# Patient Record
Sex: FEMALE | Race: WHITE | URBAN - METROPOLITAN AREA
[De-identification: names, ages, dates, MRNs, and addresses within clinical notes are randomized per-mention and may not be internally consistent; named-entity substitution may affect disease eponyms.]

---

## 2017-05-25 ENCOUNTER — TRANSFERRED RECORDS (OUTPATIENT)
Dept: HEALTH INFORMATION MANAGEMENT | Facility: CLINIC | Age: 67
End: 2017-05-25

## 2017-05-31 ENCOUNTER — OFFICE VISIT (OUTPATIENT)
Dept: OPHTHALMOLOGY | Facility: CLINIC | Age: 67
End: 2017-05-31

## 2017-05-31 DIAGNOSIS — H02.9 EYELID LESION: Primary | ICD-10-CM

## 2017-05-31 PROCEDURE — 88305 TISSUE EXAM BY PATHOLOGIST: CPT | Performed by: OPHTHALMOLOGY

## 2017-05-31 RX ORDER — ACETAMINOPHEN 160 MG
2000 TABLET,DISINTEGRATING ORAL DAILY
COMMUNITY
Start: 2012-08-24

## 2017-05-31 RX ORDER — MAGNESIUM GLUCONATE 30 MG(550)
99 TABLET ORAL
COMMUNITY

## 2017-05-31 RX ORDER — AMPICILLIN TRIHYDRATE 250 MG
CAPSULE ORAL
COMMUNITY

## 2017-05-31 RX ORDER — GLIPIZIDE 10 MG/1
10 TABLET ORAL
COMMUNITY

## 2017-05-31 RX ORDER — DAPAGLIFLOZIN 5 MG/1
TABLET, FILM COATED ORAL
Refills: 3 | COMMUNITY
Start: 2016-09-15

## 2017-05-31 RX ORDER — FLUCONAZOLE 150 MG/1
75 TABLET ORAL
COMMUNITY
Start: 2013-11-18

## 2017-05-31 RX ORDER — GLIPIZIDE 10 MG/1
TABLET, FILM COATED, EXTENDED RELEASE ORAL
COMMUNITY
Start: 2017-02-12

## 2017-05-31 RX ORDER — ALBUTEROL SULFATE 90 UG/1
1-2 AEROSOL, METERED RESPIRATORY (INHALATION)
COMMUNITY
Start: 2013-06-13

## 2017-05-31 RX ORDER — NYSTATIN 100000 U/G
OINTMENT TOPICAL
COMMUNITY

## 2017-05-31 RX ORDER — DIPHENHYDRAMINE HCL 12.5MG/5ML
LIQUID (ML) ORAL
COMMUNITY

## 2017-05-31 RX ORDER — DIPHENOXYLATE HYDROCHLORIDE AND ATROPINE SULFATE 2.5; .025 MG/1; MG/1
TABLET ORAL
COMMUNITY
Start: 2015-09-24

## 2017-05-31 RX ORDER — ATORVASTATIN CALCIUM 10 MG/1
10 TABLET, FILM COATED ORAL
COMMUNITY
Start: 2013-11-18

## 2017-05-31 RX ORDER — OMEGA-3 FATTY ACIDS/FISH OIL 300-1000MG
200 CAPSULE ORAL
COMMUNITY

## 2017-05-31 ASSESSMENT — VISUAL ACUITY
OD_SC: 20/30
OS_SC+: -1
OD_SC+: -2
OS_SC: 20/20
METHOD: SNELLEN - LINEAR

## 2017-05-31 ASSESSMENT — ENCOUNTER SYMPTOMS: JOINT SWELLING: 1

## 2017-05-31 NOTE — PROGRESS NOTES
Lid lesion left upper lid  Excised at time of cataract surgery but has returned  No pain  It is becoming larger  She has ocular discharge on the left which she attributes to the lesion.         Operative Note - Eyelid Biopsy      May 31, 2017    Pre-operative Diagnosis: Lesion left eye Upper Eyelid    Post-operative Diagnosis: Same.    Procedure: Excision of eyelid neoplasm.    Surgeon: Christian Jensen MD    Assistant: none    Anesthesia: Local infiltration with 2% Lidocaine without epinephrine (allergy).    Complications: None.    Estimated blood loss: <5 mL    Specimen: Eyelid neoplasm to pathology.    Procedure: The patient was brought to the minor procedure room and placed supine on the operating table.  The involved eyelid was infiltrated with local anesthetic.  The area was prepped and draped in the typical sterile fashion.  A tooth forceps was used to elevate the lesion and it was excised at its base with a Loyda scissors.  Hemostasis was obtained with a high temperature cautery.      Closure of wound: The wound was allowed to granulate  Dressing: The wound was dressed with Tobradex.    Disposition: The patient left the minor procedure room in stable condition.    Christian Jensen MD  Oculoplastic and Orbital Surgery   Department of Ophthalmology and Visual Neurosciences  [facility]

## 2017-05-31 NOTE — LETTER
2017         RE:  :  MRN: Binh Lam  1950  [id number]     Dear Dr. Ruiz,    Thank you for allowing me to see your patient, Binh Lam, for an oculoplastic   consultation.  My assessment and plan are below.  For further details, please see my attached clinic note.      Lid lesion left upper lid  Excised at time of cataract surgery but has returned  No pain  It is becoming larger  She has ocular discharge on the left which she attributes to the lesion.                                                                                              Operative Note - Eyelid Biopsy        May 31, 2017     Pre-operative Diagnosis: Lesion left eye Upper Eyelid     Post-operative Diagnosis: Same.     Procedure: Excision of eyelid neoplasm.     Surgeon: Christian Jensen MD     Assistant: none     Anesthesia: Local infiltration with 2% Lidocaine without epinephrine (allergy).     Complications: None.     Estimated blood loss: <5 mL     Specimen: Eyelid neoplasm to pathology.     Procedure: The patient was brought to the minor procedure room and placed supine on the operating table.  The involved eyelid was infiltrated with local anesthetic.  The area was prepped and draped in the typical sterile fashion.  A tooth forceps was used to elevate the lesion and it was excised at its base with a Loyda scissors.  Hemostasis was obtained with a high temperature cautery.       Closure of wound: The wound was allowed to granulate  Dressing: The wound was dressed with Tobradex.     Disposition: The patient left the minor procedure room in stable condition.     Christian Jensen MD  Oculoplastic and Orbital Surgery   Department of Ophthalmology and Visual Neurosciences  [facility]          Again, thank you for allowing me to participate in the care of your patient.      Sincerely,    Christian Jensen MD  Department of Ophthalmology and Visual Neurosciences  [facility]    CC: Maykel BRUSH  [address]  [place] [address] [place] [address] [facility] [address]  VIA Mail

## 2017-05-31 NOTE — LETTER
"June 8, 2017      Brooks Alejandra  [address] [facility] [address]        Dear Brooks,    Please see below for your test results. That is a benign cyst and as long as you are healing well, no further care is necessary.     Resulted Orders   Surgical pathology exam   Result Value Ref Range    Copath Report       Patient Name: BROOKS ALEJANDRA  MR#: [id number]  Specimen #: [id number]  Collected: 5/31/2017  Received: 6/1/2017  Reported: 6/7/2017 12:13  Ordering Phy(s): SINDY VERDE    For improved result formatting, select 'View Enhanced Report Format'  under Linked Documents section.    SPECIMEN(S):  Skin, left upper eyelid    FINAL DIAGNOSIS:  Upper eyelid, left, biopsy: Apocrine hidrocystoma.    I have personally reviewed all specimens and or slides, including the  listed special stains, and used them with my medical judgement to  determine the final diagnosis.    Electronically signed out by:    Gisella Toney M.D., [facility]    CLINICAL HISTORY:  The patient is a 67 year old female with a lesion of the left upper  eyelid. It is possibly cystic on clinical exam. She undergoes biopsy to  rule out malignancy.    GROSS:  The specimen is received in formalin with proper patient identification,  labeled \"L. Upper eyelid\".  The specimen consists of a 0.3 x 0.2 x 0.2  cm unoriented segment of  white-tan skin.  The probable resection margin  is inked black, and the specimen is entirely submitted in cassette 1.  (Dictated by: Dipika Todd 6/1/2017 09:00 AM)    MICROSCOPIC:  The tissue consists of skin with a convoluted cystic space in the  dermis. The cyst is lined by a bilayer of cuboidal epithelium. Focal  apocrine differentiation is present. The cyst lumen is empty.    CPT Codes:  A: 11753-AQ5    TESTING LAB LOCATION:  [facility], [address]   [id number] [phone]    COLLECTION SITE:  Client: [facility] " [facility]  [place] [facility] [place]         If you have any questions, please call the clinic to make an appointment.    Sincerely,    Christian Jensen MD    Oculoplastic and Orbital Surgery   Department of Ophthalmology and Visual Neurosciences  [facility]

## 2017-05-31 NOTE — MR AVS SNAPSHOT
After Visit Summary   2017    Binh Lam    MRN: [id number]           Patient Information     Date Of Birth          1950        Visit Information        Provider Department      2017 1:30 PM Christian Jensen MD [facility]        Today's Diagnoses     Eyelid lesion    -  1       Follow-ups after your visit        Who to contact     If you have questions or need follow up information about today's clinic visit or your schedule please contact [facility] directly at [phone].  Normal or non-critical lab and imaging results will be communicated to you by [employer], letter or phone within 4 business days after the clinic has received the results. If you do not hear from us within 7 days, please contact the clinic through [employer] or phone. If you have a critical or abnormal lab result, we will notify you by phone as soon as possible.  Submit refill requests through [employer] or call your pharmacy and they will forward the refill request to us. Please allow 3 business days for your refill to be completed.          Additional Information About Your Visit        MyChart Information     [employer] is an electronic gateway that provides easy, online access to your medical records. With [employer], you can request a clinic appointment, read your test results, renew a prescription or communicate with your care team.     To sign up for [employer] visit the website at [email or web address]   You will be asked to enter the access code listed below, as well as some personal information. Please follow the directions to create your username and password.     Your access code is: [email or web address]  Expires: 2017  3:01 PM     Your access code will  in 90 days. If you need help or a new code, please contact your [facility] or call [phone] for assistance.        Care EveryWhere ID     This is your Care EveryWhere ID. This could  be used by other organizations to access your [facility] medical records  [id number]         Blood Pressure from Last 3 Encounters:   No data found for BP    Weight from Last 3 Encounters:   No data found for Wt              We Performed the Following     Surgical pathology exam        Primary Care Provider    [facility]       No address on file        Thank you!     Thank you for choosing [facility]  for your care. Our goal is always to provide you with excellent care. Hearing back from our patients is one way we can continue to improve our services. Please take a few minutes to complete the written survey that you may receive in the mail after your visit with us. Thank you!             Your Updated Medication List - Protect others around you: Learn how to safely use, store and throw away your medicines at www.disposemymeds.org.          This list is accurate as of: 5/31/17  3:01 PM.  Always use your most recent med list.                   Brand Name Dispense Instructions for use    albuterol 108 (90 BASE) MCG/ACT Inhaler    PROAIR HFA/PROVENTIL HFA/VENTOLIN HFA     Inhale 1-2 puffs into the lungs       atorvastatin 10 MG tablet    LIPITOR     Take 10 mg by mouth       cinnamon 500 MG Caps          DHA-EPA-VITAMIN E PO      1 tablet 3 times a day       diphenhydrAMINE 12.5 MG/5ML solution    BENADRYL         FARXIGA 5 MG Tabs tablet   Generic drug:  dapagliflozin          fluconazole 150 MG tablet    DIFLUCAN     Take 75 mg by mouth       * glipiZIDE 10 MG tablet    GLUCOTROL     Take 10 mg by mouth       * glipiZIDE 10 MG 24 hr tablet    GLUCOTROL XL         ibuprofen 200 MG capsule      Take 200 mg by mouth       linagliptin 5 MG Tabs tablet    TRADJENTA     Take 5 mg by mouth       MULTI-VITAMINS Tabs      Take 2 tabs daily       nystatin ointment    MYCOSTATIN         RA POTASSIUM GLUCONATE 595 (99 K) MG Tabs   Generic drug:  Potassium Gluconate      Take 99 mg by  mouth       vitamin D3 2000 UNITS Caps      Take 2,000 Units by mouth daily       * Notice:  This list has 2 medication(s) that are the same as other medications prescribed for you. Read the directions carefully, and ask your doctor or other care provider to review them with you.

## 2017-06-07 LAB — COPATH REPORT: NORMAL
